# Patient Record
Sex: MALE | Race: OTHER | Employment: FULL TIME | URBAN - METROPOLITAN AREA
[De-identification: names, ages, dates, MRNs, and addresses within clinical notes are randomized per-mention and may not be internally consistent; named-entity substitution may affect disease eponyms.]

---

## 2017-01-23 RX ORDER — CLINDAMYCIN PHOSPHATE 11.9 MG/ML
SOLUTION TOPICAL
Qty: 60 ML | Refills: 4 | Status: SHIPPED | OUTPATIENT
Start: 2017-01-23 | End: 2018-05-29 | Stop reason: SDUPTHER

## 2017-01-24 ENCOUNTER — HOSPITAL ENCOUNTER (OUTPATIENT)
Dept: ENDOSCOPY | Age: 47
Discharge: HOME | End: 2017-01-16
Admitting: INTERNAL MEDICINE

## 2017-08-29 ENCOUNTER — TELEPHONE (OUTPATIENT)
Dept: DERMATOLOGY | Age: 47
End: 2017-08-29

## 2017-08-29 NOTE — TELEPHONE ENCOUNTER
Adan called to schedule with Dr. Rishi Givens for his skin condition. I scheduled him on 1/25/18 but he said there is no way he can wait that long and that his condition (he didn't specify what it was but said Dr. Rishi Givens has treated this in the past) will worsen over the next week or so. He would like a call to get in sooner.

## 2017-08-29 NOTE — TELEPHONE ENCOUNTER
After I last saw him, he was supposed to be seeing a liver doctor before considering any other treatment. Did he get this done? He may want to try to get in with his PCP in the mean time b/c we may not be able to get him in within the next week.

## 2017-10-24 NOTE — TELEPHONE ENCOUNTER
Spoke to Joe Schneider requesting progress/path notes from Dr. Uvaldo Porras was seen 12/2016 and procedure on 1/2017.

## 2018-01-25 ENCOUNTER — OFFICE VISIT (OUTPATIENT)
Dept: DERMATOLOGY | Age: 48
End: 2018-01-25

## 2018-01-25 DIAGNOSIS — Z79.899 ON ISOTRETINOIN THERAPY: ICD-10-CM

## 2018-01-25 DIAGNOSIS — L66.2 FOLLICULITIS DECALVANS: ICD-10-CM

## 2018-01-25 DIAGNOSIS — L70.0 ACNE VULGARIS: Primary | ICD-10-CM

## 2018-01-25 LAB
ALBUMIN SERPL-MCNC: 4.1 G/DL (ref 3.4–5)
ALP BLD-CCNC: 78 U/L (ref 40–129)
ALT SERPL-CCNC: 145 U/L (ref 10–40)
ANION GAP SERPL CALCULATED.3IONS-SCNC: 15 MMOL/L (ref 3–16)
AST SERPL-CCNC: 69 U/L (ref 15–37)
BASOPHILS ABSOLUTE: 0.1 K/UL (ref 0–0.2)
BASOPHILS RELATIVE PERCENT: 0.9 %
BILIRUB SERPL-MCNC: 0.4 MG/DL (ref 0–1)
BILIRUBIN DIRECT: <0.2 MG/DL (ref 0–0.3)
BILIRUBIN, INDIRECT: ABNORMAL MG/DL (ref 0–1)
BUN BLDV-MCNC: 12 MG/DL (ref 7–20)
CALCIUM SERPL-MCNC: 9.1 MG/DL (ref 8.3–10.6)
CHLORIDE BLD-SCNC: 98 MMOL/L (ref 99–110)
CHOLESTEROL, TOTAL: 367 MG/DL (ref 0–199)
CO2: 22 MMOL/L (ref 21–32)
CREAT SERPL-MCNC: 0.8 MG/DL (ref 0.9–1.3)
EOSINOPHILS ABSOLUTE: 0.2 K/UL (ref 0–0.6)
EOSINOPHILS RELATIVE PERCENT: 2.7 %
GFR AFRICAN AMERICAN: >60
GFR NON-AFRICAN AMERICAN: >60
GLUCOSE BLD-MCNC: 339 MG/DL (ref 70–99)
HCT VFR BLD CALC: 44.3 % (ref 40.5–52.5)
HDLC SERPL-MCNC: 35 MG/DL (ref 40–60)
HEMOGLOBIN: 15.3 G/DL (ref 13.5–17.5)
LDL CHOLESTEROL CALCULATED: ABNORMAL MG/DL
LDL CHOLESTEROL DIRECT: 116 MG/DL
LYMPHOCYTES ABSOLUTE: 1.3 K/UL (ref 1–5.1)
LYMPHOCYTES RELATIVE PERCENT: 21.8 %
MCH RBC QN AUTO: 30 PG (ref 26–34)
MCHC RBC AUTO-ENTMCNC: 34.5 G/DL (ref 31–36)
MCV RBC AUTO: 86.7 FL (ref 80–100)
MONOCYTES ABSOLUTE: 0.5 K/UL (ref 0–1.3)
MONOCYTES RELATIVE PERCENT: 8.1 %
NEUTROPHILS ABSOLUTE: 3.9 K/UL (ref 1.7–7.7)
NEUTROPHILS RELATIVE PERCENT: 66.5 %
PDW BLD-RTO: 13.3 % (ref 12.4–15.4)
PHOSPHORUS: 2.6 MG/DL (ref 2.5–4.9)
PLATELET # BLD: 209 K/UL (ref 135–450)
PMV BLD AUTO: 9.1 FL (ref 5–10.5)
POTASSIUM SERPL-SCNC: 4.3 MMOL/L (ref 3.5–5.1)
RBC # BLD: 5.11 M/UL (ref 4.2–5.9)
SODIUM BLD-SCNC: 135 MMOL/L (ref 136–145)
TOTAL PROTEIN: 6.7 G/DL (ref 6.4–8.2)
TRIGL SERPL-MCNC: 1313 MG/DL (ref 0–150)
VLDLC SERPL CALC-MCNC: ABNORMAL MG/DL
WBC # BLD: 5.8 K/UL (ref 4–11)

## 2018-01-25 PROCEDURE — 99214 OFFICE O/P EST MOD 30 MIN: CPT | Performed by: DERMATOLOGY

## 2018-01-25 RX ORDER — DOXYCYCLINE 100 MG/1
TABLET ORAL
Refills: 5 | COMMUNITY
Start: 2017-12-23

## 2018-01-25 NOTE — PROGRESS NOTES
changing moles or lesions. Past Medical History, Family History, Surgical History, Medications and Allergies reviewed. Past Medical History:   Diagnosis Date    Asthma     Cellulitis 06/29/2016    cellulitis RLE    Cellulitis 2009    left calf    Fatty liver     GERD (gastroesophageal reflux disease)     MRSA (methicillin resistant staph aureus) culture positive 06/30/2016    leg wound       Past Surgical History:   Procedure Laterality Date    ENDOSCOPY, COLON, DIAGNOSTIC      EGD    KNEE ARTHROSCOPY      left knee       Outpatient Prescriptions Marked as Taking for the 1/25/18 encounter (Office Visit) with Gildardo Cintron MD   Medication Sig Dispense Refill    doxycycline monohydrate (ADOXA) 100 MG tablet TAKE 1 TABLET BY MOUTH EVERY DAY  5    SYMBICORT 80-4.5 MCG/ACT AERO       albuterol sulfate  (90 BASE) MCG/ACT inhaler Inhale 2 puffs into the lungs every 6 hours as needed for Wheezing         Allergies   Allergen Reactions    Ceftin [Cefuroxime Axetil] Itching and Rash         Physical Examination     Gen, well-appearing  The following were examined and determined to be normal: Psych/Neuro, Conjunctivae/eyelids, Gums/teeth/lips and Neck. LUE, RUE  The following were examined and determined to be abnormal: Scalp/hair, Head/face, Breast/axilla/chest and Back. Upper back and central chest with multiple erythematous papules, pustules and few papulonodules and mild scarring  Occipital and vertex of the scalp with scattered follicular erythematous 1-3 mm pustules  Face with scattered erythematous papules and few pustules    Baseline photos from last visit below - similar today. Assessment and Plan     1.  Acne and ? mild/Early FD on the scalp  - OK to cont doxy for now but plan for isotretinoin and d/c doxy when starts isotretinoin  - will start Isotretinoin 40 mg daily, labs permitting  Educ risk chelitis, dryness, epistaxis, arthralgias/myalgias, photosensitivity, HA, alopecia, liver/blood/cholesterol abnl, N/V/ab pain, mood changes. Educ no blood donations, no preg for females, no med sharing. Educ no waxing, no surg up until 6-12 mos off med. Check CBC, renal, liver, lipids today and in 1 month. *discussed liver risk and ed f/u with GI if rising liver enzymes    He's failed both kraig/doxy and topical clindamycin at this point so hopefully will be approved. *planning to have wisdom teeth out soon  *no crohn's disease  *wears contacts  *no depression    2.  Hx of MRSA skin infection, nares neg in the past; no signs recurrence  - bactroban oint bid - tid prn flares of pustules

## 2018-01-26 ENCOUNTER — TELEPHONE (OUTPATIENT)
Dept: DERMATOLOGY | Age: 48
End: 2018-01-26

## 2018-02-27 ENCOUNTER — TELEPHONE (OUTPATIENT)
Dept: DERMATOLOGY | Age: 48
End: 2018-02-27

## 2018-03-05 RX ORDER — MINOCYCLINE HYDROCHLORIDE 100 MG/1
CAPSULE ORAL
Qty: 60 CAPSULE | Refills: 1 | Status: SHIPPED | OUTPATIENT
Start: 2018-03-05

## 2018-03-08 ENCOUNTER — TELEPHONE (OUTPATIENT)
Dept: DERMATOLOGY | Age: 48
End: 2018-03-08

## 2018-03-16 ENCOUNTER — PATIENT MESSAGE (OUTPATIENT)
Dept: DERMATOLOGY | Age: 48
End: 2018-03-16

## 2018-04-19 ENCOUNTER — TELEPHONE (OUTPATIENT)
Dept: DERMATOLOGY | Age: 48
End: 2018-04-19

## 2018-05-02 RX ORDER — CLINDAMYCIN PHOSPHATE 10 UG/ML
LOTION TOPICAL
Qty: 60 ML | Refills: 3 | Status: SHIPPED | OUTPATIENT
Start: 2018-05-02 | End: 2022-10-13 | Stop reason: SDUPTHER

## 2018-05-02 RX ORDER — CLINDAMYCIN PHOSPHATE 11.9 MG/ML
SOLUTION TOPICAL
Qty: 60 ML | Refills: 4 | Status: CANCELLED | OUTPATIENT
Start: 2018-05-02

## 2018-05-25 ENCOUNTER — PATIENT MESSAGE (OUTPATIENT)
Dept: DERMATOLOGY | Age: 48
End: 2018-05-25

## 2018-05-29 ENCOUNTER — PATIENT MESSAGE (OUTPATIENT)
Dept: DERMATOLOGY | Age: 48
End: 2018-05-29

## 2018-05-29 RX ORDER — CLINDAMYCIN PHOSPHATE 11.9 MG/ML
SOLUTION TOPICAL
Qty: 60 ML | Refills: 4 | Status: CANCELLED | OUTPATIENT
Start: 2018-05-29

## 2018-05-29 RX ORDER — CLINDAMYCIN PHOSPHATE 10 UG/ML
LOTION TOPICAL
Qty: 60 ML | Refills: 3 | Status: CANCELLED | OUTPATIENT
Start: 2018-05-29

## 2018-05-29 RX ORDER — CLINDAMYCIN PHOSPHATE 11.9 MG/ML
SOLUTION TOPICAL
Qty: 60 ML | Refills: 4 | Status: SHIPPED | OUTPATIENT
Start: 2018-05-29 | End: 2019-06-13 | Stop reason: SDUPTHER

## 2018-07-12 ENCOUNTER — TELEPHONE (OUTPATIENT)
Dept: DERMATOLOGY | Age: 48
End: 2018-07-12

## 2018-07-12 NOTE — TELEPHONE ENCOUNTER
Received refill request from [facility] for clindamycin lotion. New rx was sent on 5/2/2018. Per pharmacy patient picked up clindamycin lotion on 5/22, 6/2 and 6/15. Spoke to patient-he does not need refills until his appt on 8/7/2018.

## 2018-08-06 ENCOUNTER — TELEPHONE (OUTPATIENT)
Dept: DERMATOLOGY | Age: 48
End: 2018-08-06

## 2019-06-10 ENCOUNTER — PATIENT MESSAGE (OUTPATIENT)
Dept: DERMATOLOGY | Age: 49
End: 2019-06-10

## 2019-06-13 RX ORDER — CLINDAMYCIN PHOSPHATE 11.9 MG/ML
SOLUTION TOPICAL
Qty: 60 ML | Refills: 2 | Status: SHIPPED | OUTPATIENT
Start: 2019-06-13 | End: 2022-10-13 | Stop reason: SDUPTHER

## 2019-06-21 ENCOUNTER — PATIENT MESSAGE (OUTPATIENT)
Dept: DERMATOLOGY | Age: 49
End: 2019-06-21

## 2019-06-30 ENCOUNTER — APPOINTMENT (OUTPATIENT)
Dept: CT IMAGING | Age: 49
End: 2019-06-30
Payer: COMMERCIAL

## 2019-06-30 ENCOUNTER — HOSPITAL ENCOUNTER (EMERGENCY)
Age: 49
Discharge: HOME | End: 2019-06-30
Payer: COMMERCIAL

## 2019-06-30 VITALS
TEMPERATURE: 96.4 F | HEART RATE: 98 BPM | OXYGEN SATURATION: 98 % | WEIGHT: 238 LBS | RESPIRATION RATE: 18 BRPM | BODY MASS INDEX: 36.07 KG/M2 | SYSTOLIC BLOOD PRESSURE: 112 MMHG | DIASTOLIC BLOOD PRESSURE: 76 MMHG | HEIGHT: 68 IN

## 2019-06-30 DIAGNOSIS — R51.9 ACUTE NONINTRACTABLE HEADACHE, UNSPECIFIED HEADACHE TYPE: ICD-10-CM
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
## 2019-06-30 LAB
GLUCOSE BLD-MCNC: 100 MG/DL (ref 70–99)
PERFORMED ON: ABNORMAL

## 2019-06-30 PROCEDURE — 70450 CT HEAD/BRAIN W/O DYE: CPT

## 2019-06-30 PROCEDURE — 99284 EMERGENCY DEPT VISIT MOD MDM: CPT

## 2019-06-30 ASSESSMENT — ENCOUNTER SYMPTOMS
RESPIRATORY NEGATIVE: 1
COUGH: 0
COLOR CHANGE: 0
SHORTNESS OF BREATH: 0
ABDOMINAL PAIN: 0
PHOTOPHOBIA: 0
NAUSEA: 0
CONSTIPATION: 0
DIARRHEA: 0
VOMITING: 0

## 2019-06-30 NOTE — ED NOTES
Pt states he is diabetic, POCT glucose 100, pt requests something to eat. Pt to remain NPO until result of CT scan, per Elijah, [address]. Updated pt.       Aries Staples RN  06/30/19 4631

## 2019-06-30 NOTE — ED NOTES
Pt ambulates to restroom without use of ambulatory aid, gait is steady, denies increase in dizziness, denies CP or SoB. Urine sample obtained at this time. Pt returned to bed, updated on plan of care, awaiting CT. No distress noted.       Yohan Srinivasan RN  06/30/19 1519
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
## 2019-08-06 RX ORDER — CLINDAMYCIN PHOSPHATE 11.9 MG/ML
SOLUTION TOPICAL
Qty: 60 ML | Refills: 2 | OUTPATIENT
Start: 2019-08-06

## 2021-03-27 ENCOUNTER — HOSPITAL ENCOUNTER (OUTPATIENT)
Age: 51
Discharge: HOME | End: 2021-03-27
Payer: COMMERCIAL

## 2021-03-27 LAB
ALBUMIN SERPL-MCNC: 4.1 G/DL (ref 3.4–5)
ALP BLD-CCNC: 62 U/L (ref 40–129)
ALT SERPL-CCNC: 142 U/L (ref 10–40)
AST SERPL-CCNC: 60 U/L (ref 15–37)
BILIRUB SERPL-MCNC: 0.3 MG/DL (ref 0–1)
BILIRUBIN DIRECT: <0.2 MG/DL (ref 0–0.3)
BILIRUBIN, INDIRECT: ABNORMAL MG/DL (ref 0–1)
TOTAL PROTEIN: 7 G/DL (ref 6.4–8.2)

## 2021-03-27 PROCEDURE — 36415 COLL VENOUS BLD VENIPUNCTURE: CPT

## 2021-03-27 PROCEDURE — 80076 HEPATIC FUNCTION PANEL: CPT

## 2021-12-18 ENCOUNTER — HOSPITAL ENCOUNTER (OUTPATIENT)
Age: 51
Discharge: HOME | End: 2021-12-18
Payer: COMMERCIAL

## 2021-12-18 LAB
ALBUMIN SERPL-MCNC: 4 G/DL (ref 3.4–5)
ALP BLD-CCNC: 67 U/L (ref 40–129)
ALT SERPL-CCNC: 59 U/L (ref 10–40)
AST SERPL-CCNC: 27 U/L (ref 15–37)
BILIRUB SERPL-MCNC: <0.2 MG/DL (ref 0–1)
BILIRUBIN DIRECT: <0.2 MG/DL (ref 0–0.3)
BILIRUBIN, INDIRECT: ABNORMAL MG/DL (ref 0–1)
TOTAL PROTEIN: 7.3 G/DL (ref 6.4–8.2)

## 2021-12-18 PROCEDURE — 80076 HEPATIC FUNCTION PANEL: CPT

## 2021-12-18 PROCEDURE — 36415 COLL VENOUS BLD VENIPUNCTURE: CPT

## 2021-12-30 ENCOUNTER — HOSPITAL ENCOUNTER (OUTPATIENT)
Age: 51
Discharge: HOME | End: 2021-12-30
Payer: COMMERCIAL

## 2021-12-30 LAB
BASOPHILS ABSOLUTE: 0.1 K/UL (ref 0–0.2)
BASOPHILS RELATIVE PERCENT: 0.9 %
EOSINOPHILS ABSOLUTE: 0.4 K/UL (ref 0–0.6)
EOSINOPHILS RELATIVE PERCENT: 5.3 %
HCT VFR BLD CALC: 42.6 % (ref 40.5–52.5)
HEMOGLOBIN: 14 G/DL (ref 13.5–17.5)
INR BLD: 0.96 (ref 0.88–1.12)
LYMPHOCYTES ABSOLUTE: 1.7 K/UL (ref 1–5.1)
LYMPHOCYTES RELATIVE PERCENT: 25.7 %
MCH RBC QN AUTO: 28.6 PG (ref 26–34)
MCHC RBC AUTO-ENTMCNC: 33 G/DL (ref 31–36)
MCV RBC AUTO: 86.9 FL (ref 80–100)
MONOCYTES ABSOLUTE: 0.5 K/UL (ref 0–1.3)
MONOCYTES RELATIVE PERCENT: 7.8 %
NEUTROPHILS ABSOLUTE: 4 K/UL (ref 1.7–7.7)
NEUTROPHILS RELATIVE PERCENT: 60.3 %
PDW BLD-RTO: 13.8 % (ref 12.4–15.4)
PLATELET # BLD: 272 K/UL (ref 135–450)
PMV BLD AUTO: 7.8 FL (ref 5–10.5)
PROTHROMBIN TIME: 10.8 SEC (ref 9.9–12.7)
RBC # BLD: 4.9 M/UL (ref 4.2–5.9)
WBC # BLD: 6.6 K/UL (ref 4–11)

## 2021-12-30 PROCEDURE — 85025 COMPLETE CBC W/AUTO DIFF WBC: CPT

## 2021-12-30 PROCEDURE — 85610 PROTHROMBIN TIME: CPT

## 2021-12-30 PROCEDURE — 36415 COLL VENOUS BLD VENIPUNCTURE: CPT

## 2022-10-13 ENCOUNTER — OFFICE VISIT (OUTPATIENT)
Dept: DERMATOLOGY | Age: 52
End: 2022-10-13
Payer: COMMERCIAL

## 2022-10-13 DIAGNOSIS — L70.0 ACNE VULGARIS: Primary | ICD-10-CM

## 2022-10-13 DIAGNOSIS — L66.2 FOLLICULITIS DECALVANS: ICD-10-CM

## 2022-10-13 PROCEDURE — 99204 OFFICE O/P NEW MOD 45 MIN: CPT | Performed by: DERMATOLOGY

## 2022-10-13 RX ORDER — LISINOPRIL 10 MG/1
TABLET ORAL
COMMUNITY
Start: 2022-09-17

## 2022-10-13 RX ORDER — METFORMIN HYDROCHLORIDE 500 MG/1
TABLET, EXTENDED RELEASE ORAL
COMMUNITY
Start: 2022-09-17

## 2022-10-13 RX ORDER — CLINDAMYCIN PHOSPHATE 11.9 MG/ML
SOLUTION TOPICAL
Qty: 60 ML | Refills: 5 | Status: SHIPPED | OUTPATIENT
Start: 2022-10-13

## 2022-10-13 RX ORDER — DULAGLUTIDE 0.75 MG/.5ML
INJECTION, SOLUTION SUBCUTANEOUS
COMMUNITY
Start: 2022-08-05

## 2022-10-13 RX ORDER — OLOPATADINE HYDROCHLORIDE 2 MG/ML
SOLUTION/ DROPS OPHTHALMIC
COMMUNITY

## 2022-10-13 RX ORDER — CLINDAMYCIN PHOSPHATE 10 UG/ML
LOTION TOPICAL
Qty: 60 ML | Refills: 5 | Status: SHIPPED | OUTPATIENT
Start: 2022-10-13

## 2022-10-13 NOTE — Clinical Note
Will you please call [facility] and see if they have him in their records? He said he had a colonoscopy last month by an MD there but the name he gave me is not listed on their site. So I'm not sure who he saw.

## 2022-10-13 NOTE — PROGRESS NOTES
[facility]  Marcelo Richards MD  [facility]  1970    46 y.o. male     Date of Visit: 10/13/2022    Chief Complaint: acne  Chief Complaint   Patient presents with    Acne     Still comes and goes-     Last seen: 1-2018  *previously saw Rolando Spence - thought to be c/w fatty liver    History of Present Illness:    F/u for acne/breakouts. Mainly breakouts in the back, central chest and scalp. Face not bad. *using BP wash recently and felt clindagel helped some but out of it for the past few years. Last seen almost 5 years ago. Hx since at least 2010 of frequent overall asx papular and pustular breakouts on the chest, back, scalp and occasionally the face. He had been on kraig for many mos prior to his initial visit - it helped initially but then started flaring again, so we had agreed to proceed with isotretinoin at previous visits but insurance denied coverage then had abnl liver labs at last visit so was referred to GI prior to starting isotretinoin and then didn't f/u for > 1 year and then hasn't followed up in 4.5 years. Has been breaking out mainly on the central chest and scalp and to lesser degree face and back. Has taken doxy in the past - from Dr. Ed[address]ard [address] (PCP) x several mos - unsure if improvement. Has taken kraig several times in the past - but then c/o HA with taking in 2018 so stopped. *He was hospitalized with MRSA skin infection in the past - treated with IV clinda. He previously had worries that every new papule/pustule he has could be MRSA. Previous trx for breakouts:  He has also been treated with ceftin and developed a rash. Has been treated with Bactrim in the past with some improvement, no resolution. Culture 7/2012 with E cloacae.    Culture of the scalp in 0-0332 to r/o GN folliculitis - grew few G+ cocci resembling strep; no staph, no GN growth    He sees Dr. Ed[address]ard [address] (PCP) and had been seeing Dr. Ed[address] (PCP's brother - derm). No family or personal hx of Crohn's or depression. He wears contacts. Review of Systems:  Gen: Feels well, good sense of health. Skin: No changing moles or lesions. Past Medical History, Family History, Surgical History, Medications and Allergies reviewed. Past Medical History:   Diagnosis Date    Asthma     Cellulitis 06/29/2016    cellulitis RLE    Cellulitis 2009    left calf    Fatty liver     GERD (gastroesophageal reflux disease)     MRSA (methicillin resistant staph aureus) culture positive 06/30/2016    leg wound       Past Surgical History:   Procedure Laterality Date    ENDOSCOPY, COLON, DIAGNOSTIC      EGD    KNEE ARTHROSCOPY      left knee       Outpatient Medications Marked as Taking for the 10/13/22 encounter (Office Visit) with Shirley Yi MD   Medication Sig Dispense Refill    TRULICITY 1.79 UJ/7.0PM SOPN       lisinopril (PRINIVIL;ZESTRIL) 10 MG tablet       metFORMIN (GLUCOPHAGE-XR) 500 MG extended release tablet       olopatadine (PATADAY) 0.2 % SOLN ophthalmic solution Pataday Once Daily Relief 0.2 % eye drops   INSTILL 1 DROP IN AFFECTED EYE(S) EVERY DAY AS NEEDED      SYMBICORT 80-4.5 MCG/ACT AERO       albuterol sulfate  (90 BASE) MCG/ACT inhaler Inhale 2 puffs into the lungs every 6 hours as needed for Wheezing         Allergies   Allergen Reactions    Ceftin [Cefuroxime Axetil] Itching and Rash         Physical Examination     Gen, well-appearing      Upper back and central chest with multiple erythematous papules, pustules and few papulonodules and mild scarring  Occipital and vertex of the scalp with scattered follicular erythematous 1-3 mm pustules  Face with scattered erythematous papules and few pustules    Baseline photos from past visit below - similar today. Assessment and Plan     1. Acne vulgaris, moderate activity, chronic, flaring  2.  C/w mild/Early FD on the scalp - mild today  - discussed trx options - will resume topical trx first  - he might like to proceed with isotretinoin but I have concerns with liver enzymes chronically elevated    *He's failed both kraig/doxy or had SE with both in the past so will stay off of these.   *no crohn's disease  *wears contacts  *no depression    *GI doc -he reports new doc is [facility] - did colonoscopy last month  - AST/ALT elevated in 9-2022  - TG elevated in 300's but LDL OK  - will discuss with GI/liver before further consideration of isotretinoin

## 2022-10-20 ENCOUNTER — PATIENT MESSAGE (OUTPATIENT)
Dept: DERMATOLOGY | Age: 52
End: 2022-10-20

## 2022-10-20 ENCOUNTER — TELEPHONE (OUTPATIENT)
Dept: DERMATOLOGY | Age: 52
End: 2022-10-20

## 2022-10-20 NOTE — TELEPHONE ENCOUNTER
Dr. Pérez [facility] office called. Their phone number is [phone]. They said the most recent labs they have are from December of 2021. They had ordered some blood work in June but the pt either didn't get it done or they never received the results.  will more than likely not be able to [facility] He has not seen this pt since December of 2021.  He had a colonoscopy in August.

## 2022-10-20 NOTE — TELEPHONE ENCOUNTER
Patient is listed under Dr. Castro Stone. Requesting specific information regarding liver enzymes-patient would like to start an acne medication (Accutane). Left message regarding accutane and liver enzymes. Scheduled appointment? No follow up scheduled. Last appointment? 8/26/2022-colonoscopy, 12/2021 last in office visit.

## 2022-11-03 NOTE — TELEPHONE ENCOUNTER
Pt called stated he is confused do he  RX or wait until his appt on 11/15 to discuss accutane   C/b [phone]  Please advise   thanks

## 2022-12-05 NOTE — TELEPHONE ENCOUNTER
We let him know that he needs to see GI/liver for OK to proceed with isotretinoin d/t persistently elevated liver enzymes.

## 2022-12-30 ENCOUNTER — HOSPITAL ENCOUNTER (OUTPATIENT)
Age: 52
Discharge: HOME | End: 2022-12-30
Payer: COMMERCIAL

## 2022-12-30 LAB
ALBUMIN SERPL-MCNC: 3.7 G/DL (ref 3.4–5)
ALP BLD-CCNC: 68 U/L (ref 40–129)
ALT SERPL-CCNC: 158 U/L (ref 10–40)
AST SERPL-CCNC: 108 U/L (ref 15–37)
BILIRUB SERPL-MCNC: 0.3 MG/DL (ref 0–1)
BILIRUBIN DIRECT: <0.2 MG/DL (ref 0–0.3)
BILIRUBIN, INDIRECT: ABNORMAL MG/DL (ref 0–1)
TOTAL PROTEIN: 7.3 G/DL (ref 6.4–8.2)

## 2022-12-30 PROCEDURE — 36415 COLL VENOUS BLD VENIPUNCTURE: CPT

## 2022-12-30 PROCEDURE — 80076 HEPATIC FUNCTION PANEL: CPT

## 2023-10-18 RX ORDER — CLINDAMYCIN PHOSPHATE 10 UG/ML
LOTION TOPICAL
Qty: 60 ML | Refills: 1 | Status: SHIPPED | OUTPATIENT
Start: 2023-10-18

## 2023-10-18 RX ORDER — CLINDAMYCIN PHOSPHATE 11.9 MG/ML
SOLUTION TOPICAL
Qty: 60 ML | Refills: 1 | Status: SHIPPED | OUTPATIENT
Start: 2023-10-18

## 2024-04-30 ENCOUNTER — OFFICE VISIT (OUTPATIENT)
Dept: DERMATOLOGY | Age: 54
End: 2024-04-30
Payer: COMMERCIAL

## 2024-04-30 DIAGNOSIS — L70.0 ACNE VULGARIS: Primary | ICD-10-CM

## 2024-04-30 PROCEDURE — 99214 OFFICE O/P EST MOD 30 MIN: CPT | Performed by: DERMATOLOGY

## 2024-04-30 RX ORDER — ROSUVASTATIN CALCIUM 5 MG/1
5 TABLET, COATED ORAL EVERY EVENING
COMMUNITY
Start: 2024-01-17

## 2024-04-30 RX ORDER — BENZOYL PEROXIDE 50 MG/ML
LIQUID TOPICAL
Qty: 230 G | Refills: 11 | Status: SHIPPED | OUTPATIENT
Start: 2024-04-30

## 2024-04-30 RX ORDER — ISOTRETINOIN 40 MG/1
CAPSULE ORAL
Qty: 30 CAPSULE | Refills: 0 | Status: SHIPPED | OUTPATIENT
Start: 2024-04-30

## 2024-04-30 RX ORDER — CLINDAMYCIN PHOSPHATE 11.9 MG/ML
SOLUTION TOPICAL
Qty: 60 ML | Refills: 5 | Status: SHIPPED | OUTPATIENT
Start: 2024-04-30

## 2024-04-30 RX ORDER — MELOXICAM 7.5 MG/1
7.5 TABLET ORAL DAILY
COMMUNITY
Start: 2023-11-06

## 2024-04-30 RX ORDER — ISOTRETINOIN 40 MG/1
CAPSULE ORAL
COMMUNITY
Start: 2023-01-20

## 2024-04-30 RX ORDER — CLINDAMYCIN PHOSPHATE 10 UG/ML
LOTION TOPICAL
Qty: 60 ML | Refills: 5 | Status: SHIPPED | OUTPATIENT
Start: 2024-04-30

## 2024-04-30 NOTE — PROGRESS NOTES
pustules  Face with scattered erythematous papules and few pustules    Baseline photos from past visit below - similar today.            Assessment and Plan     1. Acne vulgaris, moderate activity, chronic, flaring on the trunk  2. C/w mild/Early FD on the scalp - mild - mod today  - discussed trx options - resume topicals but he would like to proceed with isotretinoin since liver labs have normalized    *He's failed both kraig/doxy or had SE with both in the past so will stay off of these.  *no crohn's disease  *wears contacts  *no depression    *GI doc - Dr. Park - [facility] - seen 1-2024 and rec f/u 7-2024  - AST/ALT back to normal over 2023 and most recently checked 4-2024  - TG elevated in 260's but LDL OK    - resume clinda lotion for the trunk and soln for the scalp  - cont BP wash; ed bleaching and irritation  - would like to proceed with isotretinoin    Isotretinoin 40 mg daily  - discussed ipledge process, monthly appts, labs, duration and risks  Pt educated regarding risk chelitis, dryness, epistaxis, arthralgias/myalgias, photosensitivity, HA, alopecia, liver/blood/cholesterol abnl, N/V/ab pain, mood changes and teratogeneticity.  Educ no blood donations, no pregnancy for females, no med sharing.   Educ no waxing, no surgery/laser up until 6-12 mos off med.   Avoid extra vit A.    Labs done 4-2024 with PCP - care everywhere  Check liver, lipids in 1 month.

## 2024-05-10 ENCOUNTER — PATIENT MESSAGE (OUTPATIENT)
Dept: DERMATOLOGY | Age: 54
End: 2024-05-10

## 2024-05-13 NOTE — TELEPHONE ENCOUNTER
From: Adan Butts  To: Dr. Gema Rice  Sent: 5/10/2024 1:46 PM EDT  Subject: IPledge    Good afternoon,    Nathanael called me to get my ipledge number. Is [phone] my ipledge number? Kasia wrote a number on the ipledge sheet I signed and initialed and I cannot recall if that is the number. They may call you to get that information.    Thanks,    Ben

## 2024-05-28 ENCOUNTER — PATIENT MESSAGE (OUTPATIENT)
Dept: DERMATOLOGY | Age: 54
End: 2024-05-28

## 2024-05-28 DIAGNOSIS — Z79.899 ON ISOTRETINOIN THERAPY: Primary | ICD-10-CM

## 2024-05-29 NOTE — TELEPHONE ENCOUNTER
From: Adan Butts  To: Dr. Gema Rice  Sent: 5/28/2024 6:15 PM EDT  Subject: Lab blood work    Hello,    I am following up to see if I need to do lab work prior to my next appointment.    Thanks,    Ben

## 2024-05-31 DIAGNOSIS — Z79.899 ON ISOTRETINOIN THERAPY: ICD-10-CM

## 2024-06-01 LAB
ALBUMIN SERPL-MCNC: 4 G/DL (ref 3.4–5)
ALP SERPL-CCNC: 87 U/L (ref 40–129)
ALT SERPL-CCNC: 40 U/L (ref 10–40)
AST SERPL-CCNC: 23 U/L (ref 15–37)
BILIRUB DIRECT SERPL-MCNC: <0.2 MG/DL (ref 0–0.3)
BILIRUB INDIRECT SERPL-MCNC: NORMAL MG/DL (ref 0–1)
BILIRUB SERPL-MCNC: <0.2 MG/DL (ref 0–1)
CHOLEST SERPL-MCNC: 159 MG/DL (ref 0–199)
HDLC SERPL-MCNC: 29 MG/DL (ref 40–60)
LDLC SERPL CALC-MCNC: ABNORMAL MG/DL
LDLC SERPL-MCNC: 44 MG/DL
PROT SERPL-MCNC: 7 G/DL (ref 6.4–8.2)
TRIGL SERPL-MCNC: 807 MG/DL (ref 0–150)
VLDLC SERPL CALC-MCNC: ABNORMAL MG/DL

## 2024-06-03 ENCOUNTER — OFFICE VISIT (OUTPATIENT)
Dept: DERMATOLOGY | Age: 54
End: 2024-06-03
Payer: COMMERCIAL

## 2024-06-03 DIAGNOSIS — E78.1 HYPERTRIGLYCERIDEMIA: ICD-10-CM

## 2024-06-03 DIAGNOSIS — L70.0 ACNE VULGARIS: Primary | ICD-10-CM

## 2024-06-03 DIAGNOSIS — Z79.899 ON ISOTRETINOIN THERAPY: ICD-10-CM

## 2024-06-03 DIAGNOSIS — L66.2 FOLLICULITIS DECALVANS: ICD-10-CM

## 2024-06-03 PROCEDURE — 99214 OFFICE O/P EST MOD 30 MIN: CPT | Performed by: DERMATOLOGY

## 2024-06-03 NOTE — PATIENT INSTRUCTIONS
No isotretinoin for the next 2 weeks  Check bloodwork again around next Friday 6/14/24 and we'll call you with results and tell you what to take or not take.    I'll call your PCP in the mean time and see about adding another medicine for your triglycerides.  Follow up with me in 6 weeks.

## 2024-06-03 NOTE — PROGRESS NOTES
consider resuming at 20 mg daily    - discussed ipledge process, monthly appts, labs, duration and risks  Pt educated regarding risk chelitis, dryness, epistaxis, arthralgias/myalgias, photosensitivity, HA, alopecia, liver/blood/cholesterol abnl, N/V/ab pain, mood changes and teratogeneticity.  Educ no blood donations, no pregnancy for females, no med sharing.   Educ no waxing, no surgery/laser up until 6-12 mos off med.   Avoid extra vit A.      Baseline Labs done 4-2024 with PCP - care everywhere  Check liver, lipids monthly while on trx

## 2024-06-08 ENCOUNTER — TELEPHONE (OUTPATIENT)
Dept: DERMATOLOGY | Age: 54
End: 2024-06-08

## 2024-06-21 DIAGNOSIS — E78.1 HYPERTRIGLYCERIDEMIA: ICD-10-CM

## 2024-06-21 DIAGNOSIS — Z79.899 ON ISOTRETINOIN THERAPY: ICD-10-CM

## 2024-06-21 LAB — TRIGL SERPL-MCNC: 149 MG/DL (ref 0–150)

## 2024-07-09 ENCOUNTER — PATIENT MESSAGE (OUTPATIENT)
Dept: DERMATOLOGY | Age: 54
End: 2024-07-09

## 2024-07-09 NOTE — TELEPHONE ENCOUNTER
From: Adan Butts  To: Dr. Gema Rice  Sent: 7/9/2024 11:43 AM EDT  Subject: Lab work    Good morning,    Just confirming if I need to another lab work before my appointment next Monday.    Thanks,    Ben

## 2024-07-15 ENCOUNTER — OFFICE VISIT (OUTPATIENT)
Dept: DERMATOLOGY | Age: 54
End: 2024-07-15
Payer: COMMERCIAL

## 2024-07-15 DIAGNOSIS — Z79.899 ON ISOTRETINOIN THERAPY: ICD-10-CM

## 2024-07-15 DIAGNOSIS — L66.2 FOLLICULITIS DECALVANS: ICD-10-CM

## 2024-07-15 DIAGNOSIS — E78.1 HYPERTRIGLYCERIDEMIA: ICD-10-CM

## 2024-07-15 DIAGNOSIS — L70.0 ACNE VULGARIS: Primary | ICD-10-CM

## 2024-07-15 PROCEDURE — 99214 OFFICE O/P EST MOD 30 MIN: CPT | Performed by: DERMATOLOGY

## 2024-07-15 RX ORDER — FENOFIBRATE 145 MG/1
145 TABLET, COATED ORAL DAILY
COMMUNITY
Start: 2024-06-13

## 2024-07-15 NOTE — PROGRESS NOTES
[facility]  Gema Rice MD  [phone]      Adan Butts  1970    54 y.o. male     Date of Visit: 7/15/2024    Chief Complaint: acne  Chief Complaint   Patient presents with    Acne     Last seen: ~6 weeks ago  *previously saw [facility] - Dr. María Dumont - thought to be c/w fatty liver  *now seeing Dr. Park - [facility] - as recently as 1-2024    History of Present Illness:    F/u for truncal acne/breakouts + scalp breakouts.  Mainly breakouts in the back, central chest and scalp.  Face not bad.    Started on isotretinoin 5-2024:  Month 1 - 40 mg daily - 4/30/24    *paused after 1st month x 2 weeks d/t elevated TG's  Month 2 - 20 mg daily - rx 6/24/24 after TG's normalized    Doing well and has definite improvement with fewer lesions on the torso since started.    Had significant elevation in his triglycerides to 807 with first month of treatment so stopped medication for 2 weeks and resumed lower dose 20 mg after triglycerides normalized.  Was also started on Tricor by his PCP.    *previously using BP wash and topical clinda helps (lotion and soln for trunk and scalp) but continues to have frequent new lesions and never clear.    *TG's elevated to 807 (done 5/31/24) - up from 269 on 4/8/24  Liver numbers wnl 5/31/24  *NO abdom pain    Isotretinoin Symptom Survey:   Dry lips: Yes      Dry eyes: mild   Dry skin: Yes    Muscle aches or Pains: No   Nose bleeds: No   Frequent Headaches: No   Mood swings:  No   Depression: No   Suicidal thoughts: No   Rash: No         Severe sun sensitivity or sunburn: No       Hx since at least 2010 of frequent overall asx papular and pustular breakouts on the chest, back, scalp and occasionally the face.  He had been on kraig for many mos prior to his initial visit - it helped initially but then started flaring again, so we had agreed to proceed with isotretinoin at previous visits but insurance denied coverage then had abnl liver labs at previous

## 2024-07-15 NOTE — PATIENT INSTRUCTIONS
Get labs done around next Thursday/Friday and if everything looks good, then I'll send in the new rx for higher dose of isotretinoin (40 mg/day).    Continue taking the tricor (fenofibrate).

## 2024-07-22 DIAGNOSIS — E78.1 HYPERTRIGLYCERIDEMIA: ICD-10-CM

## 2024-07-22 LAB — TRIGL SERPL-MCNC: 233 MG/DL (ref 0–150)

## 2024-07-23 RX ORDER — ISOTRETINOIN 40 MG/1
CAPSULE ORAL
Qty: 30 CAPSULE | Refills: 0 | Status: SHIPPED | OUTPATIENT
Start: 2024-07-23

## 2024-07-25 ENCOUNTER — HOSPITAL ENCOUNTER (OUTPATIENT)
Age: 54
Discharge: HOME | End: 2024-07-25
Payer: COMMERCIAL

## 2024-07-25 LAB
ALBUMIN SERPL-MCNC: 4.3 G/DL (ref 3.4–5)
ALP SERPL-CCNC: 68 U/L (ref 40–129)
ALT SERPL-CCNC: 39 U/L (ref 10–40)
ANION GAP SERPL CALCULATED.3IONS-SCNC: 10 MMOL/L (ref 3–16)
AST SERPL-CCNC: 31 U/L (ref 15–37)
BASOPHILS # BLD: 0.1 K/UL (ref 0–0.2)
BASOPHILS NFR BLD: 0.9 %
BILIRUB DIRECT SERPL-MCNC: <0.2 MG/DL (ref 0–0.3)
BILIRUB INDIRECT SERPL-MCNC: NORMAL MG/DL (ref 0–1)
BILIRUB SERPL-MCNC: 0.3 MG/DL (ref 0–1)
BUN SERPL-MCNC: 16 MG/DL (ref 7–20)
CALCIUM SERPL-MCNC: 9.5 MG/DL (ref 8.3–10.6)
CHLORIDE SERPL-SCNC: 103 MMOL/L (ref 99–110)
CO2 SERPL-SCNC: 24 MMOL/L (ref 21–32)
CREAT SERPL-MCNC: 1.1 MG/DL (ref 0.9–1.3)
DEPRECATED RDW RBC AUTO: 14 % (ref 12.4–15.4)
EOSINOPHIL # BLD: 0.5 K/UL (ref 0–0.6)
EOSINOPHIL NFR BLD: 6.3 %
GFR SERPLBLD CREATININE-BSD FMLA CKD-EPI: 80 ML/MIN/{1.73_M2}
GLUCOSE SERPL-MCNC: 138 MG/DL (ref 70–99)
HCT VFR BLD AUTO: 46.3 % (ref 40.5–52.5)
HGB BLD-MCNC: 15.4 G/DL (ref 13.5–17.5)
INR PPP: 0.99 (ref 0.85–1.15)
LYMPHOCYTES # BLD: 2.3 K/UL (ref 1–5.1)
LYMPHOCYTES NFR BLD: 32.7 %
MCH RBC QN AUTO: 28.7 PG (ref 26–34)
MCHC RBC AUTO-ENTMCNC: 33.3 G/DL (ref 31–36)
MCV RBC AUTO: 86.3 FL (ref 80–100)
MONOCYTES # BLD: 0.7 K/UL (ref 0–1.3)
MONOCYTES NFR BLD: 10.4 %
NEUTROPHILS # BLD: 3.5 K/UL (ref 1.7–7.7)
NEUTROPHILS NFR BLD: 49.7 %
PLATELET # BLD AUTO: 309 K/UL (ref 135–450)
PMV BLD AUTO: 7.8 FL (ref 5–10.5)
POTASSIUM SERPL-SCNC: 4.5 MMOL/L (ref 3.5–5.1)
PROT SERPL-MCNC: 7.8 G/DL (ref 6.4–8.2)
PROTHROMBIN TIME: 13.3 SEC (ref 11.9–14.9)
RBC # BLD AUTO: 5.37 M/UL (ref 4.2–5.9)
SODIUM SERPL-SCNC: 137 MMOL/L (ref 136–145)
WBC # BLD AUTO: 7.1 K/UL (ref 4–11)

## 2024-07-25 PROCEDURE — 85025 COMPLETE CBC W/AUTO DIFF WBC: CPT

## 2024-07-25 PROCEDURE — 80048 BASIC METABOLIC PNL TOTAL CA: CPT

## 2024-07-25 PROCEDURE — 85610 PROTHROMBIN TIME: CPT

## 2024-07-25 PROCEDURE — 80076 HEPATIC FUNCTION PANEL: CPT

## 2024-07-25 PROCEDURE — 36415 COLL VENOUS BLD VENIPUNCTURE: CPT

## 2024-08-14 ENCOUNTER — PATIENT MESSAGE (OUTPATIENT)
Dept: DERMATOLOGY | Age: 54
End: 2024-08-14

## 2024-08-16 ENCOUNTER — TELEPHONE (OUTPATIENT)
Dept: DERMATOLOGY | Age: 54
End: 2024-08-16

## 2024-08-16 DIAGNOSIS — Z79.899 ON ISOTRETINOIN THERAPY: Primary | ICD-10-CM

## 2024-08-16 NOTE — TELEPHONE ENCOUNTER
Patient needs to [facility].  They do not see the orders in there.  He has an appointment on 8:15 Monday with Dr. Rice.  [phone]

## 2024-08-19 ENCOUNTER — OFFICE VISIT (OUTPATIENT)
Dept: DERMATOLOGY | Age: 54
End: 2024-08-19
Payer: COMMERCIAL

## 2024-08-19 ENCOUNTER — PATIENT MESSAGE (OUTPATIENT)
Dept: DERMATOLOGY | Age: 54
End: 2024-08-19

## 2024-08-19 DIAGNOSIS — L66.2 FOLLICULITIS DECALVANS: ICD-10-CM

## 2024-08-19 DIAGNOSIS — L70.0 ACNE VULGARIS: Primary | ICD-10-CM

## 2024-08-19 DIAGNOSIS — Z79.899 ON ISOTRETINOIN THERAPY: ICD-10-CM

## 2024-08-19 DIAGNOSIS — E78.1 HYPERTRIGLYCERIDEMIA: ICD-10-CM

## 2024-08-19 PROCEDURE — 99214 OFFICE O/P EST MOD 30 MIN: CPT | Performed by: DERMATOLOGY

## 2024-08-19 NOTE — PATIENT INSTRUCTIONS
Address: [facility] [address], [place] [address]    Hours:   Monday 6:30?AM-5?PM   Tuesday 6:30?AM-5?PM   Wednesday 6:30?AM-5?PM   Thursday 6:30?AM-5?PM   Friday 6:30?AM-5?PM   Saturday 8?AM-12?PM   Sunday Closed     Phone: [phone]

## 2024-08-20 DIAGNOSIS — Z79.899 ON ISOTRETINOIN THERAPY: ICD-10-CM

## 2024-08-20 LAB — TRIGL SERPL-MCNC: 187 MG/DL (ref 0–150)

## 2024-08-20 RX ORDER — ISOTRETINOIN 40 MG/1
CAPSULE ORAL
Qty: 30 CAPSULE | Refills: 0 | Status: SHIPPED | OUTPATIENT
Start: 2024-08-20

## 2024-08-28 ENCOUNTER — PATIENT MESSAGE (OUTPATIENT)
Dept: DERMATOLOGY | Age: 54
End: 2024-08-28

## 2024-09-18 ENCOUNTER — PATIENT MESSAGE (OUTPATIENT)
Dept: DERMATOLOGY | Age: 54
End: 2024-09-18

## 2024-10-03 ENCOUNTER — OFFICE VISIT (OUTPATIENT)
Dept: DERMATOLOGY | Age: 54
End: 2024-10-03
Payer: COMMERCIAL

## 2024-10-03 DIAGNOSIS — L66.2 FOLLICULITIS DECALVANS: ICD-10-CM

## 2024-10-03 DIAGNOSIS — E78.1 HYPERTRIGLYCERIDEMIA: ICD-10-CM

## 2024-10-03 DIAGNOSIS — Z79.899 ON ISOTRETINOIN THERAPY: ICD-10-CM

## 2024-10-03 DIAGNOSIS — L70.0 ACNE VULGARIS: Primary | ICD-10-CM

## 2024-10-03 PROCEDURE — 99214 OFFICE O/P EST MOD 30 MIN: CPT | Performed by: DERMATOLOGY

## 2024-10-03 RX ORDER — ISOTRETINOIN 40 MG/1
CAPSULE ORAL
Qty: 30 CAPSULE | Refills: 0 | Status: SHIPPED | OUTPATIENT
Start: 2024-10-03

## 2024-10-03 NOTE — PROGRESS NOTES
Gema LOCKETT MD   Medication Sig Dispense Refill    ISOtretinoin (ACCUTANE) 40 MG chemo capsule One po daily with food. 30 capsule 0    fenofibrate (TRICOR) 145 MG tablet Take 1 tablet by mouth daily      ISOtretinoin (ACCUTANE) 20 MG chemo capsule One po daily with food. 30 capsule 0    empagliflozin (JARDIANCE) 25 MG tablet Take 1 tablet by mouth Every Day      ISOtretinoin (ACCUTANE) 40 MG chemo capsule Accutane( 40MG Oral   ) Active -Hx Entry Oral for 0      meloxicam (MOBIC) 7.5 MG tablet Take 1 tablet by mouth daily      rosuvastatin (CRESTOR) 5 MG tablet Take 1 tablet by mouth every evening      clindamycin (CLEOCIN T) 1 % external solution APPLY TO THE AFFECTED AREA ON THE SCALP TWICE DAILY 60 mL 5    clindamycin (CLEOCIN T) 1 % lotion APPLY TOPICALLY TO THE AFFECTED AREA TWICE DAILY 60 mL 5    benzoyl peroxide 5 % external liquid Wash affected areas 1-2 times daily for breakouts.  Use goodrx codes if not covered. 230 g 11    lisinopril (PRINIVIL;ZESTRIL) 10 MG tablet       metFORMIN (GLUCOPHAGE-XR) 500 MG extended release tablet       SYMBICORT 80-4.5 MCG/ACT AERO       albuterol sulfate  (90 BASE) MCG/ACT inhaler Inhale 2 puffs into the lungs every 6 hours as needed for Wheezing         Allergies   Allergen Reactions    Ceftin [Cefuroxime Axetil] Itching and Rash         Physical Examination     Gen, well-appearing    Back and chest clear except for faint old scars  Face clear  Scalp with few excoriations    Baseline photos from past visit below -no clear            Assessment and Plan     1. Acne vulgaris, moderate activity, chronic, mainly on the trunk but already improved on isotretinoin; not at goal dose  2. C/w mild/Early FD on the scalp - minimal - mild today  2. Elevated TG's (> 800) - presumed secondary to isotretinoin, improved for now    *He's failed both kraig/doxy or had SE with both in the past so will stay off of these.  *no crohn's disease  *wears contacts  *no depression    *GI doc -

## 2024-10-11 ENCOUNTER — TELEPHONE (OUTPATIENT)
Dept: DERMATOLOGY | Age: 54
End: 2024-10-11

## 2024-10-11 NOTE — TELEPHONE ENCOUNTER
Patient called and he is taking the Accutane, was wondering if he can also use clyindamycin lotion and take the Accutane at the same time.    [phone]

## 2024-10-14 NOTE — TELEPHONE ENCOUNTER
Recently, patient has had acne areas on neck and was wondering can he resume clindamycin lotion while on accutane?      He used the medication in the past.

## 2024-11-09 ENCOUNTER — HOSPITAL ENCOUNTER (OUTPATIENT)
Age: 54
Discharge: HOME | End: 2024-11-09
Payer: COMMERCIAL

## 2024-11-09 DIAGNOSIS — L70.0 ACNE VULGARIS: ICD-10-CM

## 2024-11-09 LAB — TRIGL SERPL-MCNC: 171 MG/DL (ref 0–150)

## 2024-11-09 PROCEDURE — 36415 COLL VENOUS BLD VENIPUNCTURE: CPT

## 2024-11-09 PROCEDURE — 84478 ASSAY OF TRIGLYCERIDES: CPT

## 2024-11-11 ENCOUNTER — OFFICE VISIT (OUTPATIENT)
Dept: DERMATOLOGY | Age: 54
End: 2024-11-11
Payer: COMMERCIAL

## 2024-11-11 DIAGNOSIS — E78.1 HYPERTRIGLYCERIDEMIA: ICD-10-CM

## 2024-11-11 DIAGNOSIS — L70.0 ACNE VULGARIS: Primary | ICD-10-CM

## 2024-11-11 DIAGNOSIS — Z51.81 MEDICATION MONITORING ENCOUNTER: ICD-10-CM

## 2024-11-11 PROCEDURE — 99214 OFFICE O/P EST MOD 30 MIN: CPT | Performed by: DERMATOLOGY

## 2024-11-11 RX ORDER — ISOTRETINOIN 40 MG/1
CAPSULE ORAL
Qty: 60 CAPSULE | Refills: 0 | Status: SHIPPED | OUTPATIENT
Start: 2024-11-11

## 2024-11-11 NOTE — PROGRESS NOTES
[facility]  Gema Rice MD  [phone]      Adan Butts  1970    54 y.o. male     Date of Visit: 11/11/2024    Chief Complaint: acne  Chief Complaint   Patient presents with    Acne     Last seen: ~ 1 month ago  *previously saw [facility] - Dr. María Dumont - thought to be c/w fatty liver  *now seeing Dr. Park - [facility] - as recently as 1-2024    History of Present Illness:    F/u for truncal acne/breakouts + scalp breakouts.  Mainly breakouts in the back, central chest and scalp.  Face not bad.    Started on isotretinoin 5-2024:  Month 1 - 40 mg daily - 4/30/24    *paused after 1st month x 2 weeks d/t elevated TG's  Month 2 - 20 mg daily - rx 6/24/24 after TG's normalized  Month 3 - 40 mg daily - rx 7/23/24  Month 4 - 40 mg daily - rx 8/19/24  Month 5 - 40 mg daily - rx 10/3/24 - cumulative dose is 5400 mg - most recent TG are 170's (11/2024)    Goal based on 100 kg is 15,000 mg    Doing well and has definite improvement with fewer lesions on the torso and scalp since started.  Face clear and back and chest are now clear as well.  Has a few scattered crusted macules on the scalp at times but overall clear.    Had significant elevation in his triglycerides to 807 with first month of treatment so stopped medication for 2 weeks and resumed lower dose 20 mg after triglycerides normalized.  Was also started on Tricor by his PCP.    *TG's elevated to 807 (done 5/31/24) - up from 269 on 4/8/24  *repeat normalized to 149, then slightly up to 200's with resuming 20 mg/d, stable at 187 on 40 mg/d 8-2024  Liver numbers wnl 5/31/24, 7/2024  *NO abdom pain    Isotretinoin Symptom Survey:   Dry lips: Yes   Dry eyes: mild   Dry skin: Yes    Muscle aches or Pains: No   Nose bleeds: No   Frequent Headaches: No   Mood swings:  No   Depression: No   Suicidal thoughts: No   Rash: No         Severe sun sensitivity or sunburn: No       *previously using BP wash and topical clinda helps (lotion and

## 2024-12-14 ENCOUNTER — HOSPITAL ENCOUNTER (OUTPATIENT)
Age: 54
Discharge: HOME | End: 2024-12-14

## 2024-12-14 DIAGNOSIS — E78.1 HYPERTRIGLYCERIDEMIA: ICD-10-CM

## 2024-12-14 DIAGNOSIS — Z51.81 MEDICATION MONITORING ENCOUNTER: ICD-10-CM

## 2024-12-14 LAB
ALBUMIN SERPL-MCNC: 4.2 G/DL (ref 3.4–5)
ALP SERPL-CCNC: 81 U/L (ref 40–129)
ALT SERPL-CCNC: 33 U/L (ref 10–40)
AST SERPL-CCNC: 27 U/L (ref 15–37)
BILIRUB DIRECT SERPL-MCNC: <0.1 MG/DL (ref 0–0.3)
BILIRUB INDIRECT SERPL-MCNC: NORMAL MG/DL (ref 0–1)
BILIRUB SERPL-MCNC: <0.2 MG/DL (ref 0–1)
PROT SERPL-MCNC: 7.3 G/DL (ref 6.4–8.2)
TRIGL SERPL-MCNC: 323 MG/DL (ref 0–150)

## 2024-12-16 ENCOUNTER — OFFICE VISIT (OUTPATIENT)
Age: 54
End: 2024-12-16
Payer: COMMERCIAL

## 2024-12-16 DIAGNOSIS — L70.0 ACNE VULGARIS: Primary | ICD-10-CM

## 2024-12-16 DIAGNOSIS — Z51.81 MEDICATION MONITORING ENCOUNTER: ICD-10-CM

## 2024-12-16 DIAGNOSIS — E78.1 HYPERTRIGLYCERIDEMIA: ICD-10-CM

## 2024-12-16 DIAGNOSIS — Z79.899 ON ISOTRETINOIN THERAPY: ICD-10-CM

## 2024-12-16 PROCEDURE — 99214 OFFICE O/P EST MOD 30 MIN: CPT | Performed by: DERMATOLOGY

## 2024-12-16 NOTE — PROGRESS NOTES
[facility]  Gema Rice MD  [phone]      Adan Butts  1970    54 y.o. male     Date of Visit: 12/16/2024    Chief Complaint: acne  Chief Complaint   Patient presents with    Follow-up     Last seen: ~ 1 month ago  *previously saw [facility] - Dr. María Dumont - thought to be c/w fatty liver  *now seeing Dr. Park - [facility] - as recently as 1-2024    History of Present Illness:    F/u for truncal acne/breakouts + scalp breakouts.  Mainly breakouts in the back, central chest and scalp.  Face not particularly bad.  All areas improved since started treatment.    Started on isotretinoin 5-2024:  Month 1 - 40 mg daily - 4/30/24    *paused after 1st month x 2 weeks d/t elevated TG's  Month 2 - 20 mg daily - rx 6/24/24 after TG's normalized  Month 3 - 40 mg daily - rx 7/23/24  Month 4 - 40 mg daily - rx 8/19/24  Month 5 - 40 mg daily - rx 10/3/24 - cumulative dose is 5400 mg - most recent TG are 170's (11/2024)  Month 6 - 80 mg daily - rx 11/11/24 - cumulative is 7800 mg -  12/14/24     Goal based on 100 kg is 15,000 mg    Doing well and has definite improvement with fewer lesions on the torso and scalp since started.  Face clear and back and chest are now clear as well.  Has had a few scattered crusted macules on the scalp at times but overall clear.    Had significant elevation in his triglycerides to 807 with first month of treatment so stopped medication for 2 weeks and resumed lower dose 20 mg after triglycerides normalized.  Was also started on Tricor by his PCP.    *TG's elevated to 807 (done 5/31/24) - up from 269 on 4/8/24  *repeat normalized to 149, then slightly up to 200's with resuming 20 mg/d, stable at 187 on 40 mg/d 8-2024  Liver numbers wnl 5/31/24, 7/2024  *NO abdom pain    *Repeat triglycerides 12/14/2024 after on higher dose of isotretinoin again (80 mg) went up slightly to 323.    Isotretinoin Symptom Survey:  Dry lips: Yes   Dry eyes: mild   Dry skin: Yes

## 2024-12-17 ENCOUNTER — PATIENT MESSAGE (OUTPATIENT)
Age: 54
End: 2024-12-17

## 2024-12-17 RX ORDER — ISOTRETINOIN 40 MG/1
CAPSULE ORAL
Qty: 60 CAPSULE | Refills: 0 | Status: SHIPPED | OUTPATIENT
Start: 2024-12-17

## 2025-01-18 ENCOUNTER — HOSPITAL ENCOUNTER (OUTPATIENT)
Age: 55
Discharge: HOME | End: 2025-01-18
Payer: COMMERCIAL

## 2025-01-18 DIAGNOSIS — E78.1 HYPERTRIGLYCERIDEMIA: ICD-10-CM

## 2025-01-18 LAB
LDLC SERPL-MCNC: 53 MG/DL
TRIGL SERPL-MCNC: 420 MG/DL (ref 0–150)

## 2025-01-18 PROCEDURE — 84478 ASSAY OF TRIGLYCERIDES: CPT

## 2025-01-18 PROCEDURE — 36415 COLL VENOUS BLD VENIPUNCTURE: CPT

## 2025-01-18 PROCEDURE — 83721 ASSAY OF BLOOD LIPOPROTEIN: CPT

## 2025-01-20 ENCOUNTER — OFFICE VISIT (OUTPATIENT)
Age: 55
End: 2025-01-20
Payer: COMMERCIAL

## 2025-01-20 DIAGNOSIS — Z79.899 ON ISOTRETINOIN THERAPY: ICD-10-CM

## 2025-01-20 DIAGNOSIS — L70.0 ACNE VULGARIS: Primary | ICD-10-CM

## 2025-01-20 PROCEDURE — 99214 OFFICE O/P EST MOD 30 MIN: CPT | Performed by: DERMATOLOGY

## 2025-01-20 RX ORDER — ISOTRETINOIN 40 MG/1
CAPSULE ORAL
Qty: 30 CAPSULE | Refills: 0 | Status: SHIPPED | OUTPATIENT
Start: 2025-01-20

## 2025-01-20 NOTE — PROGRESS NOTES
[facility]  Gema Rice MD  [phone]      Adan Butts  1970    54 y.o. male     Date of Visit: 1/20/2025    Chief Complaint: acne  Chief Complaint   Patient presents with    Acne     Last seen: ~ 1 month ago  *previously saw [facility] - Dr. María Dumont - thought to be c/w fatty liver  *now seeing Dr. Park - [facility] - as recently as 1-2024    History of Present Illness:    F/u for truncal acne/breakouts + scalp breakouts.  Mainly breakouts in the back, central chest and scalp.  Face not particularly bad.  All areas improved since started treatment.    Started on isotretinoin 5-2024:  Month 1 - 40 mg daily - 4/30/24    *paused after 1st month x 2 weeks d/t elevated TG's  Month 2 - 20 mg daily - rx 6/24/24 after TG's normalized  Month 3 - 40 mg daily - rx 7/23/24  Month 4 - 40 mg daily - rx 8/19/24  Month 5 - 40 mg daily - rx 10/3/24 - cumulative dose is 5400 mg - most recent TG are 170's (11/2024)  Month 6 - 80 mg daily - rx 11/11/24 - cumulative is 7800 mg -  12/14/24   Month 7 - 80 mg daily - rx 12- - cumulative is 10,200 mg    Goal based on 100 kg is 15,000 mg    Doing well and has definite improvement with fewer lesions on the torso and scalp since started.  Face clear and back and chest are now clear as well.  Has had a few scattered crusted macules on the scalp at times but overall clear.    Had significant elevation in his triglycerides to 807 with first month of treatment so stopped medication for 2 weeks and resumed lower dose 20 mg after triglycerides normalized.  Was also started on Tricor by his PCP.    *TG's elevated to 807 (done 5/31/24) - up from 269 on 4/8/24  *repeat normalized to 149, then slightly up to 200's with resuming 20 mg/d, stable at 187 on 40 mg/d 8-2024  Liver numbers wnl 5/31/24, 7/2024  *NO abdom pain    *Repeat triglycerides 12/14/2024 after on higher dose of isotretinoin again (80 mg) went up slightly to 323.  *have increased

## 2025-01-26 ENCOUNTER — PATIENT MESSAGE (OUTPATIENT)
Age: 55
End: 2025-01-26

## 2025-01-30 NOTE — PROGRESS NOTES
tablet by mouth daily      ISOtretinoin (ACCUTANE) 20 MG chemo capsule One po daily with food. 30 capsule 0    empagliflozin (JARDIANCE) 25 MG tablet Take 1 tablet by mouth Every Day      ISOtretinoin (ACCUTANE) 40 MG chemo capsule Accutane( 40MG Oral   ) Active -Hx Entry Oral for 0      meloxicam (MOBIC) 7.5 MG tablet Take 1 tablet by mouth daily      rosuvastatin (CRESTOR) 5 MG tablet Take 1 tablet by mouth every evening      clindamycin (CLEOCIN T) 1 % external solution APPLY TO THE AFFECTED AREA ON THE SCALP TWICE DAILY 60 mL 5    clindamycin (CLEOCIN T) 1 % lotion APPLY TOPICALLY TO THE AFFECTED AREA TWICE DAILY 60 mL 5    benzoyl peroxide 5 % external liquid Wash affected areas 1-2 times daily for breakouts.  Use goodrx codes if not covered. 230 g 11    lisinopril (PRINIVIL;ZESTRIL) 10 MG tablet       metFORMIN (GLUCOPHAGE-XR) 500 MG extended release tablet       SYMBICORT 80-4.5 MCG/ACT AERO       albuterol sulfate  (90 BASE) MCG/ACT inhaler Inhale 2 puffs into the lungs every 6 hours as needed for Wheezing         Allergies   Allergen Reactions    Ceftin [Cefuroxime Axetil] Itching and Rash         Physical Examination     Gen, well-appearing    Back and chest nearly clear except for faint old scars  Face clear  Scalp with few excoriations    Baseline photos from past visit below - much better today.            Assessment and Plan     1. Acne vulgaris, moderate activity, chronic, mainly on the trunk but already improving on isotretinoin  2. C/w mild/Early FD on the scalp - minimal - mild today  2. Elevated TG's (> 800) - presumed secondary to isotretinoin, improved for now    *He's failed both kraig/doxy or had SE with both in the past so will stay off of these.  *no crohn's disease  *wears contacts  *no depression    *GI doc - Dr. Park [facility] - seen 1-2024 and f/u 7-2024  - AST/ALT back to normal over 2023 and most recently checked 4-2024 and end of July 2024 (wnl)  - TG elevated in 260's  Detail Level: Zone

## 2025-02-03 RX ORDER — TRIAMCINOLONE ACETONIDE 1 MG/G
CREAM TOPICAL
Qty: 454 G | Refills: 0 | Status: SHIPPED | OUTPATIENT
Start: 2025-02-03

## 2025-02-10 ENCOUNTER — PATIENT MESSAGE (OUTPATIENT)
Age: 55
End: 2025-02-10

## 2025-02-15 ENCOUNTER — HOSPITAL ENCOUNTER (OUTPATIENT)
Age: 55
Discharge: HOME | End: 2025-02-15
Payer: COMMERCIAL

## 2025-02-15 DIAGNOSIS — Z79.899 ON ISOTRETINOIN THERAPY: ICD-10-CM

## 2025-02-15 LAB — TRIGL SERPL-MCNC: 200 MG/DL (ref 0–150)

## 2025-02-15 PROCEDURE — 36415 COLL VENOUS BLD VENIPUNCTURE: CPT

## 2025-02-15 PROCEDURE — 84478 ASSAY OF TRIGLYCERIDES: CPT

## 2025-02-20 ENCOUNTER — OFFICE VISIT (OUTPATIENT)
Age: 55
End: 2025-02-20
Payer: COMMERCIAL

## 2025-02-20 DIAGNOSIS — L70.0 ACNE VULGARIS: Primary | ICD-10-CM

## 2025-02-20 DIAGNOSIS — Z51.81 MEDICATION MONITORING ENCOUNTER: ICD-10-CM

## 2025-02-20 DIAGNOSIS — E78.1 HYPERTRIGLYCERIDEMIA: ICD-10-CM

## 2025-02-20 DIAGNOSIS — Z79.899 ON ISOTRETINOIN THERAPY: ICD-10-CM

## 2025-02-20 DIAGNOSIS — L66.2 FOLLICULITIS DECALVANS: ICD-10-CM

## 2025-02-20 PROCEDURE — 99214 OFFICE O/P EST MOD 30 MIN: CPT | Performed by: DERMATOLOGY

## 2025-02-20 RX ORDER — ISOTRETINOIN 40 MG/1
CAPSULE ORAL
Qty: 60 CAPSULE | Refills: 0 | Status: SHIPPED | OUTPATIENT
Start: 2025-02-20

## 2025-02-20 NOTE — PROGRESS NOTES
7-2024  - AST/ALT back to normal over 2023 and most recently checked 4-2024 and end of July 2024 (wnl), wnl   - TG elevated in 260's at baseline but LDL OK    - cont fenofibrate from PCP  Already taking fish oil  Doesn't drink alcohol    Continue isotretinoin - increase to 80 mg daily again since TG have decreased again  (170's , up slightly 323 , back to 200 2-2025)  Will decide when to recheck fasting TG after f/u 1 month at next appt.    Likely plan to DC isotretinoin at follow-up.    - discussed ipledge process, monthly appts, labs, duration and risks  Pt educated regarding risk chelitis, dryness, epistaxis, arthralgias/myalgias, photosensitivity, HA, alopecia, liver/blood/cholesterol abnl, N/V/ab pain, mood changes and teratogeneticity.  Educ no blood donations, no pregnancy for females, no med sharing.   Educ no waxing, no surgery/laser up until 6-12 mos off med.   Avoid extra vit A.    Baseline Labs done 4-2024 with PCP - care everywhere    F/u 1 month    The patient (or guardian, if applicable) and other individuals in attendance with the patient were advised that Artificial Intelligence will be utilized during this visit to record, process the conversation to generate a clinical note, and support improvement of the AI technology. The patient (or guardian, if applicable) and other individuals in attendance at the appointment consented to the use of AI, including the recording.

## 2025-03-25 ENCOUNTER — OFFICE VISIT (OUTPATIENT)
Age: 55
End: 2025-03-25
Payer: COMMERCIAL

## 2025-03-25 DIAGNOSIS — E78.1 HYPERTRIGLYCERIDEMIA: ICD-10-CM

## 2025-03-25 DIAGNOSIS — Z51.81 MEDICATION MONITORING ENCOUNTER: ICD-10-CM

## 2025-03-25 DIAGNOSIS — Z79.899 ON ISOTRETINOIN THERAPY: ICD-10-CM

## 2025-03-25 DIAGNOSIS — L70.0 ACNE VULGARIS: Primary | ICD-10-CM

## 2025-03-25 PROCEDURE — 99214 OFFICE O/P EST MOD 30 MIN: CPT | Performed by: DERMATOLOGY

## 2025-03-25 NOTE — PROGRESS NOTES
[facility]  Gema Rice MD  [phone]      Adan Butts  1970    54 y.o. male     Date of Visit: 3/25/2025    Chief Complaint: acne  Chief Complaint   Patient presents with    Acne     Doing well      Last seen: ~ 1 month ago  *previously saw [facility] - Dr. María Dumont - thought to be c/w fatty liver  *now seeing Dr. Park - [facility] - as recently as 1-2024    History of Present Illness:  History of Present Illness  The patient, a 54-year-old male, presents for a follow-up evaluation of acne.    F/u for truncal acne/breakouts + scalp breakouts.  - The patient has been undergoing treatment with isotretinoin for the past 9 months.  Mainly breakouts in the back, central chest and scalp.  Face not particularly bad.  All areas improved since started treatment.    Hypertriglyceridemia  - Developed during isotretinoin treatment.  - Managed effectively with fenofibrate and dietary modifications.    Xerosis and dermatitis  - Minor xerosis attributed to cold weather.  - Alleviated with the application of Aveeno lotion.  - rx'd TAC to use as well    MEDICATIONS  Current: isotretinoin, fenofibrate, triamcinolone        Started on isotretinoin 5-2024:  Month 1 - 40 mg daily - 4/30/24    *paused after 1st month x 2 weeks d/t elevated TG's  Month 2 - 20 mg daily - rx 6/24/24 after TG's normalized  Month 3 - 40 mg daily - rx 7/23/24  Month 4 - 40 mg daily - rx 8/19/24  Month 5 - 40 mg daily - rx 10/3/24 - cumulative dose is 5400 mg - most recent TG are 170's (11/2024)  Month 6 - 80 mg daily - rx 11/11/24 - cumulative is 7800 mg -  12/14/24   Month 7 - 80 mg daily - rx 12- - cumulative is 10,200 mg  Month 8 - 40 mg daily -Rx 1- - cumulative is 11,400 mg  Month 9-80 mg daily - rx 2/20/25 -cumulative 13,800 mg    Approx cumulative goal based on 100 kg is 15,000 mg    Doing well and has definite improvement with fewer lesions on the torso and scalp since started.  Face clear